# Patient Record
Sex: MALE | URBAN - METROPOLITAN AREA
[De-identification: names, ages, dates, MRNs, and addresses within clinical notes are randomized per-mention and may not be internally consistent; named-entity substitution may affect disease eponyms.]

---

## 2017-04-10 ENCOUNTER — APPOINTMENT (RX ONLY)
Dept: URBAN - METROPOLITAN AREA CLINIC 128 | Facility: CLINIC | Age: 62
Setting detail: DERMATOLOGY
End: 2017-04-10

## 2017-04-10 ENCOUNTER — RX ONLY (OUTPATIENT)
Age: 62
Setting detail: RX ONLY
End: 2017-04-10

## 2017-04-10 DIAGNOSIS — L40.0 PSORIASIS VULGARIS: ICD-10-CM

## 2017-04-10 DIAGNOSIS — L40.4 GUTTATE PSORIASIS: ICD-10-CM

## 2017-04-10 DIAGNOSIS — L82.1 OTHER SEBORRHEIC KERATOSIS: ICD-10-CM

## 2017-04-10 PROCEDURE — ? PRESCRIPTION

## 2017-04-10 PROCEDURE — 99213 OFFICE O/P EST LOW 20 MIN: CPT

## 2017-04-10 PROCEDURE — ? COUNSELING

## 2017-04-10 PROCEDURE — ? OTHER

## 2017-04-10 RX ORDER — CLOBETASOL PROPIONATE 0.05 G/ML
SPRAY TOPICAL
Qty: 1 | Refills: 2 | Status: ERX | COMMUNITY
Start: 2017-04-10

## 2017-04-10 RX ORDER — CALCIPOTRIENE AND BETAMETHASONE DIPROPIONATE 50; .5 UG/G; MG/G
AEROSOL, FOAM TOPICAL
Qty: 1 | Refills: 2 | Status: ERX | COMMUNITY
Start: 2017-04-10

## 2017-04-10 RX ORDER — CALCIPOTRIENE AND BETAMETHASONE DIPROPIONATE 50; .5 UG/G; MG/G
AEROSOL, FOAM TOPICAL
Qty: 1 | Refills: 2 | Status: ERX

## 2017-04-10 RX ORDER — TACROLIMUS 1 MG/G
OINTMENT TOPICAL BID
Qty: 1 | Refills: 2 | Status: ERX

## 2017-04-10 RX ADMIN — CLOBETASOL PROPIONATE: 0.05 SPRAY TOPICAL at 12:37

## 2017-04-10 RX ADMIN — CALCIPOTRIENE AND BETAMETHASONE DIPROPIONATE: 50; .5 AEROSOL, FOAM TOPICAL at 12:35

## 2017-04-10 ASSESSMENT — LOCATION SIMPLE DESCRIPTION DERM
LOCATION SIMPLE: LEFT UPPER ARM
LOCATION SIMPLE: RIGHT KNEE
LOCATION SIMPLE: ABDOMEN
LOCATION SIMPLE: GROIN
LOCATION SIMPLE: ANTERIOR SCALP
LOCATION SIMPLE: LEFT POSTERIOR THIGH
LOCATION SIMPLE: RIGHT POSTERIOR THIGH
LOCATION SIMPLE: RIGHT UPPER ARM
LOCATION SIMPLE: LEFT KNEE
LOCATION SIMPLE: LEFT ELBOW
LOCATION SIMPLE: RIGHT ELBOW

## 2017-04-10 ASSESSMENT — LOCATION ZONE DERM
LOCATION ZONE: TRUNK
LOCATION ZONE: SCALP
LOCATION ZONE: ARM
LOCATION ZONE: LEG

## 2017-04-10 ASSESSMENT — LOCATION DETAILED DESCRIPTION DERM
LOCATION DETAILED: LEFT DISTAL POSTERIOR THIGH
LOCATION DETAILED: LEFT DISTAL POSTERIOR UPPER ARM
LOCATION DETAILED: RIGHT KNEE
LOCATION DETAILED: LEFT KNEE
LOCATION DETAILED: RIGHT DISTAL POSTERIOR UPPER ARM
LOCATION DETAILED: RIGHT INGUINAL CREASE
LOCATION DETAILED: LEFT INGUINAL CREASE
LOCATION DETAILED: RIGHT ELBOW
LOCATION DETAILED: RIGHT DISTAL POSTERIOR THIGH
LOCATION DETAILED: MID-FRONTAL SCALP
LOCATION DETAILED: RIGHT LATERAL ABDOMEN
LOCATION DETAILED: LEFT ELBOW

## 2017-04-10 NOTE — PROCEDURE: OTHER
Note Text (......Xxx Chief Complaint.): This diagnosis correlates with the
Other (Free Text): Patient can use Enstilar or Clobetasol
Detail Level: Zone

## 2018-06-11 ENCOUNTER — IMPORTED ENCOUNTER (OUTPATIENT)
Dept: URBAN - METROPOLITAN AREA CLINIC 31 | Facility: CLINIC | Age: 63
End: 2018-06-11

## 2018-06-11 PROBLEM — H35.3121: Noted: 2018-06-11

## 2018-06-11 PROCEDURE — 92015 DETERMINE REFRACTIVE STATE: CPT

## 2018-06-11 PROCEDURE — 92004 COMPRE OPH EXAM NEW PT 1/>: CPT

## 2018-06-11 PROCEDURE — 92310 CONTACT LENS FITTING OU: CPT

## 2018-06-11 NOTE — PATIENT DISCUSSION
1.  ARMD OS dry -   Drusen macula and around [address]- OPTOS done. Pt states he was told about it years ago and to call if any changes to vision occurs. Importance of smoking cessation blood pressure control and healthy diet were emphasized. In accordance with the AREDS study a good multivitamin containing EC and Zinc were recommened to be taken daily. Patient was instructed to self monitor their monocular vision (reading/Amsler Grid) at least weekly. Patient should immediately report any new onset of decreased vision or metamorphopsia. 2. [place]-  Rx change. Rx getting better. Gave copy of rx to get at [address] cls from his prev eye dr.  -3.50/-1.50 MONO. Disp new trials of Biofinity -3.50/-1.75 for more midrange spedometer and controls in car. Using OTC over cls for very small things. Eval 100. [facility] with exam only. Gets his cls at [address] [place] [address]. 4.  RTN 10 days CLCK5. RTN 1 yr CE  [facility]. Next yr exam should be medical if evaluating the macula OS.

## 2018-06-27 ENCOUNTER — IMPORTED ENCOUNTER (OUTPATIENT)
Dept: URBAN - METROPOLITAN AREA CLINIC 31 | Facility: CLINIC | Age: 63
End: 2018-06-27

## 2019-06-19 ENCOUNTER — IMPORTED ENCOUNTER (OUTPATIENT)
Dept: URBAN - METROPOLITAN AREA CLINIC 31 | Facility: CLINIC | Age: 64
End: 2019-06-19

## 2019-06-19 PROBLEM — H35.3121: Noted: 2019-06-19

## 2019-06-19 PROBLEM — H43.812: Noted: 2019-06-19

## 2019-06-19 PROCEDURE — 92310 CONTACT LENS FITTING OU: CPT

## 2019-06-19 PROCEDURE — 92014 COMPRE OPH EXAM EST PT 1/>: CPT

## 2019-06-19 PROCEDURE — 92015 DETERMINE REFRACTIVE STATE: CPT

## 2019-06-19 NOTE — PATIENT DISCUSSION
1.  ARMD OS dry -   Drusen macula and around [facility]- OPTOS done. Pt states he was told about it years ago and to call if any changes to vision occurs. Importance of smoking cessation blood pressure control and healthy diet were emphasized. In accordance with the AREDS study a good multivitamin containing EC and Zinc were recommened to be taken daily. Patient was instructed to self monitor their monocular vision (reading/Amsler Grid) at least weekly. Patient should immediately report any new onset of decreased vision or metamorphopsia. 2. [place]-  Rx change. Rx getting better. Gave copy of rx to get at [address] [facility]s from his prev eye dr.  -3.50/-1.50 MONO. Disp new trials of Biofinity -3.50/-1.75 for more midrange spedometer and controls in car. Using OTC over cls for very small things. Eval 100. Eyemed with exam only. Gets his cls at [facility]. 4.  RTN 10 days CLCK5. RTN 1 yr CE  [facility]. Next yr exam should be medical if evaluating the macula OS.

## 2019-06-19 NOTE — PATIENT DISCUSSION
1.  PVD OS: Patient was cautioned to call our office immediately if they experience a substantial change in their symptoms such as an increase in floaters persistent flashes loss of visual field (may appear as a shadow or a curtain) or decrease in visual acuity as these may indicate a retinal tear or detachment. 2.  ARMD OS dry -   Drusen macula and around [address] 75- OPTOS done. Pt states he was told about it years ago and to call if any changes to vision occurs. Importance of smoking cessation blood pressure control and healthy diet were emphasized. In accordance with the AREDS study a good multivitamin containing EC and Zinc were recommened to be taken daily. Patient was instructed to self monitor their monocular vision (reading/Amsler Grid) at least weekly. Patient should immediately report any new onset of decreased vision or metamorphopsia. 3. [place]-  NO Rx change. Rx getting better. Gets at [address] [facility] with current brand and rx. Biofinity -3.50/-1.75 for more midrange speedometer and controls in car. Using OTC over cls for very small things. Eval 100. Eyemed with exam only. Gets his cls at [facility] 5.  . RTN 1 yr CE  [facility]. Next yr exam should be medical if evaluating the macula OS.

## 2020-06-29 ENCOUNTER — IMPORTED ENCOUNTER (OUTPATIENT)
Dept: URBAN - METROPOLITAN AREA CLINIC 31 | Facility: CLINIC | Age: 65
End: 2020-06-29

## 2020-06-29 PROBLEM — H43.813: Noted: 2020-06-29

## 2020-06-29 PROBLEM — H35.3121: Noted: 2020-06-29

## 2020-06-29 PROBLEM — H35.3131: Noted: 2020-06-29

## 2020-06-29 PROBLEM — H43.812: Noted: 2020-06-29

## 2020-06-29 PROCEDURE — 92014 COMPRE OPH EXAM EST PT 1/>: CPT

## 2020-06-29 PROCEDURE — 92310 CONTACT LENS FITTING OU: CPT

## 2020-06-29 PROCEDURE — 92250 FUNDUS PHOTOGRAPHY W/I&R: CPT

## 2020-06-29 NOTE — PATIENT DISCUSSION
1.  PVD OS: Patient was cautioned to call our office immediately if they experience a substantial change in their symptoms such as an increase in floaters persistent flashes loss of visual field (may appear as a shadow or a curtain) or decrease in visual acuity as these may indicate a retinal tear or detachment. 2.  ARMD OS dry -   Drusen macula and around [address] 75- OPTOS done. Pt states he was told about it years ago and to call if any changes to vision occurs. Importance of smoking cessation blood pressure control and healthy diet were emphasized. In accordance with the AREDS study a good multivitamin containing EC and Zinc were recommened to be taken daily. Patient was instructed to self monitor their monocular vision (reading/Amsler Grid) at least weekly. Patient should immediately report any new onset of decreased vision or metamorphopsia. 3. [place]-  NO Rx change. Dist Rx getting better. Gets at [address] [employer] with current brand. Gave sample of -1.50 for left eye and more near. Pt using [employer] -3.50/-1.75 for more midrange speedometer and controls in car. Using OTC over cls for very small things. Eval 100. Eyemed with exam only. Gets his cls at [facility]. 5.  . RTN 1 yr CE -medical---Has  [employer]. Next yr exam should be medical if evaluating the macula OS.

## 2021-10-12 ENCOUNTER — IMPORTED ENCOUNTER (OUTPATIENT)
Dept: URBAN - METROPOLITAN AREA CLINIC 31 | Facility: CLINIC | Age: 66
End: 2021-10-12

## 2021-10-12 PROBLEM — H35.3121: Noted: 2021-10-12

## 2021-10-12 PROBLEM — H43.812: Noted: 2021-10-12

## 2021-10-12 PROCEDURE — 92310 CONTACT LENS FITTING OU: CPT

## 2021-10-12 PROCEDURE — 92014 COMPRE OPH EXAM EST PT 1/>: CPT

## 2021-10-12 PROCEDURE — 92015 DETERMINE REFRACTIVE STATE: CPT

## 2021-10-12 PROCEDURE — 92134 CPTRZ OPH DX IMG PST SGM RTA: CPT

## 2021-10-12 NOTE — PATIENT DISCUSSION
1.  PVD OS: Patient was cautioned to call our office immediately if they experience a substantial change in their symptoms such as an increase in floaters persistent flashes loss of visual field (may appear as a shadow or a curtain) or decrease in visual acuity as these may indicate a retinal tear or detachment. 2.  ARMD OU dry -   OCT 10/12/21  268/267. NOrmal.  Drusen macula and around [address] done. Pt states he was told about it years ago and to call if any changes to vision occurs. Importance of smoking cessation blood pressure control and healthy diet were emphasized. In accordance with the AREDS study a good multivitamin containing EC and Zinc were recommened to be taken daily. Patient was instructed to self monitor their monocular vision (reading/Amsler Grid) at least weekly. Patient should immediately report any new onset of decreased vision or metamorphopsia. 3. [place]-  NO Rx change. Dist Rx getting better. Gets at [address] [employer] with current brand. [employer] -3.50/-1.75 for more midrange speedometer and controls in car. Using OTC over cls for very small things. Eval 100. Eyemed with exam only. Gets his cls at [facility]. 5. RTN 1 yr CE/OCT Mac  -medical---Has  [employer]. Next yr exam should be medical if evaluating the macula OS.

## 2021-10-12 NOTE — PATIENT DISCUSSION
1.  PVD OS: Patient was cautioned to call our office immediately if they experience a substantial change in their symptoms such as an increase in floaters persistent flashes loss of visual field (may appear as a shadow or a curtain) or decrease in visual acuity as these may indicate a retinal tear or detachment. 2.  ARMD OS dry -   Drusen macula and around [facility]- OPTOS done. Pt states he was told about it years ago and to call if any changes to vision occurs. Importance of smoking cessation blood pressure control and healthy diet were emphasized. In accordance with the AREDS study a good multivitamin containing EC and Zinc were recommened to be taken daily. Patient was instructed to self monitor their monocular vision (reading/Amsler Grid) at least weekly. Patient should immediately report any new onset of decreased vision or metamorphopsia. 3. [place]-  NO Rx change. Dist Rx getting better. Gets at [address] [employer] with current brand. Gave sample of -1.50 for left eye and more near. Pt using [employer] -3.50/-1.75 for more midrange speedometer and controls in car. Using OTC over cls for very small things. Eval 100. Eyemed with exam only. Gets his cls at [facility]. 5.  . RTN 1 yr CE -medical---Has  [employer]. Next yr exam should be medical if evaluating the macula OS.

## 2022-04-02 ASSESSMENT — TONOMETRY
OS_IOP_MMHG: 16
OS_IOP_MMHG: 16
OS_IOP_MMHG: 15
OS_IOP_MMHG: 16
OD_IOP_MMHG: 16
OD_IOP_MMHG: 15
OD_IOP_MMHG: 16
OD_IOP_MMHG: 16

## 2022-04-02 ASSESSMENT — VISUAL ACUITY
OD_SC: 20/25-3
OD_CC: CF@3'
OS_CC: J216"
OS_CC: CF@3'

## 2022-06-04 ENCOUNTER — TELEPHONE ENCOUNTER (OUTPATIENT)
Dept: URBAN - METROPOLITAN AREA CLINIC 68 | Facility: CLINIC | Age: 67
End: 2022-06-04

## 2022-06-05 ENCOUNTER — TELEPHONE ENCOUNTER (OUTPATIENT)
Dept: URBAN - METROPOLITAN AREA CLINIC 68 | Facility: CLINIC | Age: 67
End: 2022-06-05

## 2022-06-05 RX ORDER — MULTIVITAMIN
MULTIVITAMINS(  ORAL  DAILY ) ACTIVE -HX ENTRY CAPSULE ORAL DAILY
Status: ACTIVE | COMMUNITY
Start: 2015-07-31

## 2022-06-05 RX ORDER — VALSARTAN 40 MG/1
VALSARTAN( 40MG ORAL  DAILY ) ACTIVE -HX ENTRY TABLET ORAL DAILY
Status: ACTIVE | COMMUNITY
Start: 2015-07-31

## 2022-06-05 RX ORDER — ROSUVASTATIN CALCIUM 20 MG
CRESTOR( 20MG ORAL  DAILY ) ACTIVE -HX ENTRY TABLET ORAL DAILY
Status: ACTIVE | COMMUNITY
Start: 2015-07-31

## 2022-06-05 RX ORDER — ESOMEPRAZOLE MAGNESIUM 40 MG
NEXIUM( 40MG ORAL  TWO TIMES DAILY ) ACTIVE -HX ENTRY CAPSULE,DELAYED RELEASE (ENTERIC COATED) ORAL
Status: ACTIVE | COMMUNITY
Start: 2015-07-31

## 2022-06-25 ENCOUNTER — TELEPHONE ENCOUNTER (OUTPATIENT)
Age: 67
End: 2022-06-25

## 2022-06-26 ENCOUNTER — TELEPHONE ENCOUNTER (OUTPATIENT)
Age: 67
End: 2022-06-26

## 2022-06-26 RX ORDER — ROSUVASTATIN CALCIUM 20 MG
CRESTOR( 20MG ORAL  DAILY ) ACTIVE -HX ENTRY TABLET ORAL DAILY
Status: ACTIVE | COMMUNITY
Start: 2015-07-31

## 2022-06-26 RX ORDER — VALSARTAN 40 MG/1
VALSARTAN( 40MG ORAL  DAILY ) ACTIVE -HX ENTRY TABLET, COATED ORAL DAILY
Status: ACTIVE | COMMUNITY
Start: 2015-07-31

## 2022-06-26 RX ORDER — ESOMEPRAZOLE MAGNESIUM 40 MG
NEXIUM( 40MG ORAL  TWO TIMES DAILY ) ACTIVE -HX ENTRY CAPSULE,DELAYED RELEASE (ENTERIC COATED) ORAL
Status: ACTIVE | COMMUNITY
Start: 2015-07-31

## 2022-10-14 ENCOUNTER — ESTABLISHED PATIENT (OUTPATIENT)
Dept: URBAN - METROPOLITAN AREA CLINIC 34 | Facility: CLINIC | Age: 67
End: 2022-10-14

## 2022-10-14 DIAGNOSIS — H43.812: ICD-10-CM

## 2022-10-14 DIAGNOSIS — H35.3121: ICD-10-CM

## 2022-10-14 PROCEDURE — 92014 COMPRE OPH EXAM EST PT 1/>: CPT

## 2022-10-14 PROCEDURE — 92134 CPTRZ OPH DX IMG PST SGM RTA: CPT

## 2022-10-14 PROCEDURE — 92015 DETERMINE REFRACTIVE STATE: CPT

## 2022-10-14 PROCEDURE — 92310-5 LEVEL 5 CONTACT LENS MANAGEMENT

## 2022-10-14 ASSESSMENT — TONOMETRY
OD_IOP_MMHG: 16
OS_IOP_MMHG: 16

## 2022-10-14 ASSESSMENT — VISUAL ACUITY
OD_CC: J2
OS_CC: J2
OD_CC: 20/30
OS_CC: 20/30

## 2022-10-14 NOTE — PATIENT DISCUSSION
Drusen macula and around [facility]- OPTOS done. Pt states he was told about it years ago and to call if any changes to vision occurs.

## 2022-10-14 NOTE — PATIENT DISCUSSION
RTN 10/23 CE -medical---Has [employer] materials. Next yr exam should be medical if evaluating the macula OS.

## 2022-10-14 NOTE — PATIENT DISCUSSION
Cont with current brand. Gave sample of -1.50 for left eye and more near. Pt using [employer] -3.50/-1.75 for more midrange speedometer and controls in car. Using OTC over cls for very small things. Brianna 145. Eyemed with exam only.  Gets his cls at [facility].

## 2023-10-16 ENCOUNTER — COMPREHENSIVE EXAM (OUTPATIENT)
Dept: URBAN - METROPOLITAN AREA CLINIC 34 | Facility: CLINIC | Age: 68
End: 2023-10-16

## 2023-10-16 DIAGNOSIS — H35.3121: ICD-10-CM

## 2023-10-16 DIAGNOSIS — H43.812: ICD-10-CM

## 2023-10-16 DIAGNOSIS — H52.4: ICD-10-CM

## 2023-10-16 PROCEDURE — 92015 DETERMINE REFRACTIVE STATE: CPT

## 2023-10-16 PROCEDURE — 92134 CPTRZ OPH DX IMG PST SGM RTA: CPT

## 2023-10-16 PROCEDURE — 92014 COMPRE OPH EXAM EST PT 1/>: CPT

## 2023-10-16 PROCEDURE — 92310-5 LEVEL 5 CONTACT LENS MANAGEMENT

## 2023-10-16 ASSESSMENT — TONOMETRY
OS_IOP_MMHG: 16
OD_IOP_MMHG: 16

## 2024-10-21 ENCOUNTER — COMPREHENSIVE EXAM (OUTPATIENT)
Dept: URBAN - METROPOLITAN AREA CLINIC 34 | Facility: CLINIC | Age: 69
End: 2024-10-21

## 2024-10-21 DIAGNOSIS — H25.13: ICD-10-CM

## 2024-10-21 DIAGNOSIS — H35.3121: ICD-10-CM

## 2024-10-21 DIAGNOSIS — H52.4: ICD-10-CM

## 2024-10-21 DIAGNOSIS — H43.812: ICD-10-CM

## 2024-10-21 DIAGNOSIS — Z46.0: ICD-10-CM

## 2024-10-21 PROCEDURE — 92014 COMPRE OPH EXAM EST PT 1/>: CPT

## 2024-10-21 PROCEDURE — 92134 CPTRZ OPH DX IMG PST SGM RTA: CPT

## 2024-10-21 PROCEDURE — 92015 DETERMINE REFRACTIVE STATE: CPT

## 2024-10-21 PROCEDURE — 92310-6 LEVEL 6 NEW RGP

## 2024-10-24 ENCOUNTER — EMERGENCY VISIT (OUTPATIENT)
Dept: URBAN - METROPOLITAN AREA CLINIC 34 | Facility: CLINIC | Age: 69
End: 2024-10-24

## 2024-10-24 DIAGNOSIS — H04.122: ICD-10-CM

## 2024-10-24 PROCEDURE — 99213 OFFICE O/P EST LOW 20 MIN: CPT

## 2024-10-24 RX ORDER — NEOMYCIN SULFATE, POLYMYXIN B SULFATE AND DEXAMETHASONE 3.5; 10000; 1 MG/ML; [USP'U]/ML; MG/ML
1 SUSPENSION OPHTHALMIC TWICE A DAY
Start: 2024-10-24

## 2024-11-04 ENCOUNTER — FOLLOW UP (OUTPATIENT)
Dept: URBAN - METROPOLITAN AREA CLINIC 34 | Facility: CLINIC | Age: 69
End: 2024-11-04

## 2024-11-04 DIAGNOSIS — H04.122: ICD-10-CM

## 2024-11-04 PROCEDURE — 99213 OFFICE O/P EST LOW 20 MIN: CPT

## 2024-11-20 ENCOUNTER — FOLLOW UP (OUTPATIENT)
Dept: URBAN - METROPOLITAN AREA CLINIC 34 | Facility: CLINIC | Age: 69
End: 2024-11-20

## 2024-11-20 DIAGNOSIS — H57.89: ICD-10-CM

## 2024-11-20 PROCEDURE — 99213 OFFICE O/P EST LOW 20 MIN: CPT
